# Patient Record
Sex: MALE | Race: OTHER | HISPANIC OR LATINO | URBAN - METROPOLITAN AREA
[De-identification: names, ages, dates, MRNs, and addresses within clinical notes are randomized per-mention and may not be internally consistent; named-entity substitution may affect disease eponyms.]

---

## 2018-07-28 ENCOUNTER — EMERGENCY (EMERGENCY)
Facility: HOSPITAL | Age: 23
LOS: 1 days | Discharge: HOME | End: 2018-07-28
Attending: EMERGENCY MEDICINE | Admitting: EMERGENCY MEDICINE
Payer: SELF-PAY

## 2018-07-28 VITALS
SYSTOLIC BLOOD PRESSURE: 165 MMHG | OXYGEN SATURATION: 100 % | DIASTOLIC BLOOD PRESSURE: 97 MMHG | WEIGHT: 169.98 LBS | RESPIRATION RATE: 18 BRPM | TEMPERATURE: 99 F | HEART RATE: 66 BPM

## 2018-07-28 VITALS
DIASTOLIC BLOOD PRESSURE: 85 MMHG | OXYGEN SATURATION: 100 % | TEMPERATURE: 98 F | HEART RATE: 69 BPM | SYSTOLIC BLOOD PRESSURE: 120 MMHG | RESPIRATION RATE: 20 BRPM

## 2018-07-28 PROCEDURE — 96374 THER/PROPH/DIAG INJ IV PUSH: CPT

## 2018-07-28 PROCEDURE — 96372 THER/PROPH/DIAG INJ SC/IM: CPT | Mod: XU

## 2018-07-28 PROCEDURE — 99284 EMERGENCY DEPT VISIT MOD MDM: CPT

## 2018-07-28 PROCEDURE — 73030 X-RAY EXAM OF SHOULDER: CPT | Mod: 26,RT

## 2018-07-28 PROCEDURE — T1013: CPT

## 2018-07-28 PROCEDURE — 73030 X-RAY EXAM OF SHOULDER: CPT

## 2018-07-28 PROCEDURE — 99285 EMERGENCY DEPT VISIT HI MDM: CPT | Mod: 25

## 2018-07-28 RX ORDER — MIDAZOLAM HYDROCHLORIDE 1 MG/ML
4 INJECTION, SOLUTION INTRAMUSCULAR; INTRAVENOUS ONCE
Qty: 0 | Refills: 0 | Status: DISCONTINUED | OUTPATIENT
Start: 2018-07-28 | End: 2018-07-28

## 2018-07-28 RX ORDER — PROPOFOL 10 MG/ML
50 INJECTION, EMULSION INTRAVENOUS ONCE
Qty: 0 | Refills: 0 | Status: COMPLETED | OUTPATIENT
Start: 2018-07-28 | End: 2018-07-28

## 2018-07-28 RX ADMIN — MIDAZOLAM HYDROCHLORIDE 4 MILLIGRAM(S): 1 INJECTION, SOLUTION INTRAMUSCULAR; INTRAVENOUS at 13:30

## 2018-07-28 RX ADMIN — PROPOFOL 50 MILLIGRAM(S): 10 INJECTION, EMULSION INTRAVENOUS at 14:52

## 2018-07-28 NOTE — ED ADULT NURSE NOTE - OBJECTIVE STATEMENT
}Pt axox3 co pain to right shoulder after hurting it while reaching for a bucket at work? + deformity noted to right shoulder, + pulses noted to affected extremity

## 2018-07-28 NOTE — ED PROVIDER NOTE - ATTENDING CONTRIBUTION TO CARE
Right shoulder dislocation. Sensation intact. Shoulder will be reduced. I have discussed the plan with the ACP.

## 2018-07-28 NOTE — ED ADULT NURSE REASSESSMENT NOTE - NS ED NURSE REASSESS COMMENT FT1
Pt to be discharged home with outpt follow up with ortho .Pt denies c/op ain at time of DC, vitals within normal limits

## 2018-07-28 NOTE — ED ADULT NURSE REASSESSMENT NOTE - NS ED NURSE REASSESS COMMENT FT1
Pt completed conscious sedation procedure with out any complications.  Pt vitals with normal limits, axox3 an denies any pain at this time.

## 2018-07-28 NOTE — ED ADULT NURSE REASSESSMENT NOTE - NS ED NURSE REASSESS COMMENT FT1
SUNNY monaco unable to re-align right shoulder , pt extremely tense and not relaxing. IM versed to be used, pt placed in A2, nasal cannula and pulse ox applied. Pt updated on plan of care by

## 2018-07-31 ENCOUNTER — EMERGENCY (EMERGENCY)
Facility: HOSPITAL | Age: 23
LOS: 1 days | Discharge: HOME | End: 2018-07-31
Attending: EMERGENCY MEDICINE
Payer: SELF-PAY

## 2018-07-31 VITALS — WEIGHT: 190.04 LBS

## 2018-07-31 PROCEDURE — 73030 X-RAY EXAM OF SHOULDER: CPT | Mod: 26,76,RT

## 2018-07-31 PROCEDURE — 23650 CLTX SHO DSLC W/MNPJ WO ANES: CPT | Mod: 54

## 2018-07-31 PROCEDURE — 99284 EMERGENCY DEPT VISIT MOD MDM: CPT | Mod: 25

## 2018-07-31 PROCEDURE — 99152 MOD SED SAME PHYS/QHP 5/>YRS: CPT

## 2018-07-31 RX ORDER — MORPHINE SULFATE 50 MG/1
5 CAPSULE, EXTENDED RELEASE ORAL ONCE
Qty: 0 | Refills: 0 | Status: DISCONTINUED | OUTPATIENT
Start: 2018-07-31 | End: 2018-07-31

## 2018-07-31 RX ORDER — MIDAZOLAM HYDROCHLORIDE 1 MG/ML
2 INJECTION, SOLUTION INTRAMUSCULAR; INTRAVENOUS ONCE
Qty: 0 | Refills: 0 | Status: DISCONTINUED | OUTPATIENT
Start: 2018-07-31 | End: 2018-07-31

## 2018-07-31 RX ORDER — PROPOFOL 10 MG/ML
50 INJECTION, EMULSION INTRAVENOUS ONCE
Qty: 0 | Refills: 0 | Status: COMPLETED | OUTPATIENT
Start: 2018-07-31 | End: 2018-07-31

## 2018-07-31 RX ORDER — IBUPROFEN 200 MG
600 TABLET ORAL ONCE
Qty: 0 | Refills: 0 | Status: COMPLETED | OUTPATIENT
Start: 2018-07-31 | End: 2018-07-31

## 2018-07-31 RX ORDER — MORPHINE SULFATE 50 MG/1
4 CAPSULE, EXTENDED RELEASE ORAL ONCE
Qty: 0 | Refills: 0 | Status: DISCONTINUED | OUTPATIENT
Start: 2018-07-31 | End: 2018-07-31

## 2018-07-31 RX ADMIN — MIDAZOLAM HYDROCHLORIDE 2 MILLIGRAM(S): 1 INJECTION, SOLUTION INTRAMUSCULAR; INTRAVENOUS at 23:17

## 2018-07-31 RX ADMIN — MIDAZOLAM HYDROCHLORIDE 2 MILLIGRAM(S): 1 INJECTION, SOLUTION INTRAMUSCULAR; INTRAVENOUS at 23:05

## 2018-07-31 RX ADMIN — MORPHINE SULFATE 5 MILLIGRAM(S): 50 CAPSULE, EXTENDED RELEASE ORAL at 23:13

## 2018-07-31 RX ADMIN — MORPHINE SULFATE 5 MILLIGRAM(S): 50 CAPSULE, EXTENDED RELEASE ORAL at 22:48

## 2018-07-31 RX ADMIN — PROPOFOL 50 MILLIGRAM(S): 10 INJECTION, EMULSION INTRAVENOUS at 23:19

## 2018-07-31 RX ADMIN — PROPOFOL 50 MILLIGRAM(S): 10 INJECTION, EMULSION INTRAVENOUS at 22:59

## 2018-07-31 NOTE — ED PROVIDER NOTE - PROGRESS NOTE DETAILS
repeat xray shows shoulder successfully reduced. PT placed in sling. PT is awake and alert at this time will continue to monitor. PT alert, vitals stable. PT given referral to ORtho MD. PT educated on LROM of shoulder.

## 2018-07-31 NOTE — ED PROVIDER NOTE - OBJECTIVE STATEMENT
22 yo M PT, PmHX: multiple  shoulder dislocations, presents to ED complaining of R shoulder pain x 2 hrs. Pt states he was doing a movement and thinks his shoulder "popped" out. Pt states this is his 5th R shoulder dislocation. PT recently seen in Ed 3 days ago for similar symptoms. Pt denies numbness, tingling, weakness, recent trauma, and any other acute symptoms at this time.

## 2018-07-31 NOTE — ED PROVIDER NOTE - ATTENDING CONTRIBUTION TO CARE
I personally saw the patient with the PA, and completed the key components of the history and physical exam. I then discussed the management plan with the PA.   gen in nad resp clear cardiac no murmur abd soft msk + ttp right ant shoulder with decreased rom   reduced under consious sedation neurovasc intact post sedation stressed importance of ortho f/u again

## 2018-07-31 NOTE — ED PROVIDER NOTE - MEDICAL DECISION MAKING DETAILS
shoulder dislocation, will relocate, sling and follow up with Ortho. Educate PT on limit ROM of shoulder for 48-72 hrs.

## 2018-07-31 NOTE — ED PROCEDURE NOTE - NS_POSTPROCCAREGUIDE_ED_ALL_ED
Patient is now fully awake, with vital signs and temperature stable, hydration is adequate, patients Dario’s  score is at baseline (or greater than 8), patient and escort has received  discharge education.

## 2018-07-31 NOTE — ED PROCEDURE NOTE - NS ED PERI VASCULAR NEG
capillary refill time < 2 seconds/no swelling/fingers/toes warm to touch/no paresthesia/no cyanosis of extremity

## 2018-08-01 VITALS
OXYGEN SATURATION: 100 % | HEART RATE: 89 BPM | RESPIRATION RATE: 16 BRPM | SYSTOLIC BLOOD PRESSURE: 136 MMHG | TEMPERATURE: 99 F | DIASTOLIC BLOOD PRESSURE: 84 MMHG

## 2018-08-01 PROCEDURE — 73030 X-RAY EXAM OF SHOULDER: CPT

## 2018-08-01 PROCEDURE — 96374 THER/PROPH/DIAG INJ IV PUSH: CPT | Mod: XU

## 2018-08-01 PROCEDURE — 23650 CLTX SHO DSLC W/MNPJ WO ANES: CPT | Mod: RT

## 2018-08-01 PROCEDURE — T1013: CPT

## 2018-08-01 PROCEDURE — 99285 EMERGENCY DEPT VISIT HI MDM: CPT | Mod: 25

## 2018-08-01 PROCEDURE — 99152 MOD SED SAME PHYS/QHP 5/>YRS: CPT

## 2018-08-01 RX ADMIN — PROPOFOL 50 MILLIGRAM(S): 10 INJECTION, EMULSION INTRAVENOUS at 00:50

## 2018-08-01 NOTE — ED ADULT NURSE NOTE - OBJECTIVE STATEMENT
pt recently dislocated right shoulder was in sling and decided to go exercising extending right arm he was told not to. pt re-dislocated right shoulder.

## 2018-08-01 NOTE — ED PROCEDURE NOTE - PROCEDURE ADDITIONAL DETAILS
using sterile technique 10cc lidocaine was injected in the right glenohumeral joint. scapula manipulation was attempted and was unsuccessful. The PT was then sedated and the shoulder reduced as documented above. shoulder immobilizer apple.

## 2018-08-01 NOTE — ED ADULT NURSE NOTE - NSIMPLEMENTINTERV_GEN_ALL_ED
Implemented All Universal Safety Interventions:  [place] to call system. Call bell, personal items and telephone within reach. Instruct patient to call for assistance. Room bathroom lighting operational. Non-slip footwear when patient is off stretcher. Physically safe environment: no spills, clutter or unnecessary equipment. Stretcher in lowest position, wheels locked, appropriate side rails in place.

## 2021-02-16 ENCOUNTER — EMERGENCY (EMERGENCY)
Facility: HOSPITAL | Age: 26
LOS: 1 days | Discharge: HOME | End: 2021-02-16
Attending: EMERGENCY MEDICINE
Payer: MEDICARE

## 2021-02-16 VITALS
HEART RATE: 124 BPM | TEMPERATURE: 100 F | OXYGEN SATURATION: 97 % | RESPIRATION RATE: 18 BRPM | SYSTOLIC BLOOD PRESSURE: 124 MMHG | DIASTOLIC BLOOD PRESSURE: 86 MMHG

## 2021-02-16 VITALS — HEART RATE: 99 BPM | RESPIRATION RATE: 16 BRPM | OXYGEN SATURATION: 98 %

## 2021-02-16 LAB
ANION GAP SERPL CALC-SCNC: 9 MMOL/L — SIGNIFICANT CHANGE UP (ref 5–17)
BUN SERPL-MCNC: 9 MG/DL — SIGNIFICANT CHANGE UP (ref 8–20)
CALCIUM SERPL-MCNC: 8.8 MG/DL — SIGNIFICANT CHANGE UP (ref 8.6–10.2)
CHLORIDE SERPL-SCNC: 101 MMOL/L — SIGNIFICANT CHANGE UP (ref 98–107)
CO2 SERPL-SCNC: 26 MMOL/L — SIGNIFICANT CHANGE UP (ref 22–29)
CREAT SERPL-MCNC: 0.97 MG/DL — SIGNIFICANT CHANGE UP (ref 0.5–1.3)
GLUCOSE SERPL-MCNC: 108 MG/DL — HIGH (ref 70–99)
HCT VFR BLD CALC: 49.3 % — SIGNIFICANT CHANGE UP (ref 39–50)
HGB BLD-MCNC: 16.9 G/DL — SIGNIFICANT CHANGE UP (ref 13–17)
MCHC RBC-ENTMCNC: 30.4 PG — SIGNIFICANT CHANGE UP (ref 27–34)
MCHC RBC-ENTMCNC: 34.3 GM/DL — SIGNIFICANT CHANGE UP (ref 32–36)
MCV RBC AUTO: 88.7 FL — SIGNIFICANT CHANGE UP (ref 80–100)
PLATELET # BLD AUTO: 195 K/UL — SIGNIFICANT CHANGE UP (ref 150–400)
POTASSIUM SERPL-MCNC: 4.4 MMOL/L — SIGNIFICANT CHANGE UP (ref 3.5–5.3)
POTASSIUM SERPL-SCNC: 4.4 MMOL/L — SIGNIFICANT CHANGE UP (ref 3.5–5.3)
RBC # BLD: 5.56 M/UL — SIGNIFICANT CHANGE UP (ref 4.2–5.8)
RBC # FLD: 12.3 % — SIGNIFICANT CHANGE UP (ref 10.3–14.5)
SARS-COV-2 RNA SPEC QL NAA+PROBE: DETECTED
SODIUM SERPL-SCNC: 136 MMOL/L — SIGNIFICANT CHANGE UP (ref 135–145)
T3 SERPL-MCNC: 141 NG/DL — SIGNIFICANT CHANGE UP (ref 80–200)
T4 AB SER-ACNC: 8.2 UG/DL — SIGNIFICANT CHANGE UP (ref 4.5–12)
TSH SERPL-MCNC: 1.8 UIU/ML — SIGNIFICANT CHANGE UP (ref 0.27–4.2)
WBC # BLD: 4.26 K/UL — SIGNIFICANT CHANGE UP (ref 3.8–10.5)
WBC # FLD AUTO: 4.26 K/UL — SIGNIFICANT CHANGE UP (ref 3.8–10.5)

## 2021-02-16 PROCEDURE — 99284 EMERGENCY DEPT VISIT MOD MDM: CPT

## 2021-02-16 PROCEDURE — 84436 ASSAY OF TOTAL THYROXINE: CPT

## 2021-02-16 PROCEDURE — 84443 ASSAY THYROID STIM HORMONE: CPT

## 2021-02-16 PROCEDURE — 85027 COMPLETE CBC AUTOMATED: CPT

## 2021-02-16 PROCEDURE — U0005: CPT

## 2021-02-16 PROCEDURE — 93005 ELECTROCARDIOGRAM TRACING: CPT

## 2021-02-16 PROCEDURE — 80048 BASIC METABOLIC PNL TOTAL CA: CPT

## 2021-02-16 PROCEDURE — 36415 COLL VENOUS BLD VENIPUNCTURE: CPT

## 2021-02-16 PROCEDURE — U0003: CPT

## 2021-02-16 PROCEDURE — 93010 ELECTROCARDIOGRAM REPORT: CPT

## 2021-02-16 PROCEDURE — 99283 EMERGENCY DEPT VISIT LOW MDM: CPT | Mod: 25

## 2021-02-16 PROCEDURE — 96360 HYDRATION IV INFUSION INIT: CPT

## 2021-02-16 PROCEDURE — 84480 ASSAY TRIIODOTHYRONINE (T3): CPT

## 2021-02-16 RX ORDER — ACETAMINOPHEN 500 MG
650 TABLET ORAL ONCE
Refills: 0 | Status: COMPLETED | OUTPATIENT
Start: 2021-02-16 | End: 2021-02-16

## 2021-02-16 RX ORDER — SODIUM CHLORIDE 9 MG/ML
1000 INJECTION INTRAMUSCULAR; INTRAVENOUS; SUBCUTANEOUS ONCE
Refills: 0 | Status: COMPLETED | OUTPATIENT
Start: 2021-02-16 | End: 2021-02-16

## 2021-02-16 RX ADMIN — Medication 650 MILLIGRAM(S): at 12:34

## 2021-02-16 RX ADMIN — SODIUM CHLORIDE 1000 MILLILITER(S): 9 INJECTION INTRAMUSCULAR; INTRAVENOUS; SUBCUTANEOUS at 13:45

## 2021-02-16 RX ADMIN — SODIUM CHLORIDE 1000 MILLILITER(S): 9 INJECTION INTRAMUSCULAR; INTRAVENOUS; SUBCUTANEOUS at 12:45

## 2021-02-16 NOTE — ED PROVIDER NOTE - ATTENDING CONTRIBUTION TO CARE
Pt seen and evaluated with ACP and ED resident.  26 yo M with no significant PMH referred from COVID testing tent c/o cold s/s with no reported fever after being found to be tachycardic in 120'2.  Pt denies any assoc c/o.  Pt denies any OTC cold meds, stimulants  or any prior hx of dysrhythmia or cardiac ds.  On exam pt awake and alert, well appearing in NAD, sinus tachy 116 bpm, PERRL, mm moist, Neck supple, no thyroid masses, Cor Tachy, Lungs clear b/l, Abd soft, NT, Ext FROM, Neuro non-focal.  Will check EKG, labs. TSH, monitor and d/w Cardio as needed

## 2021-02-16 NOTE — ED PROVIDER NOTE - PHYSICAL EXAMINATION
Gen: WD/WN, NAD, non-toxic  HENT: NCAT, MMM, Oropharynx unremarkable, uvula midline  Cardiac: RRR +S1S2.   Resp: Speaking in full sentences. No evidence of respiratory distress. No wheezes, rales or rhonchi  Skin: Warm, dry, no rashes or lesions  Neuro: Awake, alert & oriented x 3. No focal deficits, ambulatory with steady gait

## 2021-02-16 NOTE — ED PROVIDER NOTE - CLINICAL SUMMARY MEDICAL DECISION MAKING FREE TEXT BOX
26yo M with cold symptoms 4 days ago, today feeling well. Well appearing, non-toxic, NAD. 24 yo male presenting for COVID swab. Asymptomatic today, found to be persistently tachy. Will give fluid, tylenol, check labs, ekg.

## 2021-02-16 NOTE — ED PROVIDER NOTE - OBJECTIVE STATEMENT
26yo M presenting for covid testing. Pt states he felt like he had a cold on Friday, 4 days ago, but symptoms have since resolved. No exposures or sick contacts. Denies fever, chills, cough, sob, cp, body aches, loss of smell/taste.

## 2021-02-16 NOTE — ED PROVIDER NOTE - NS ED ROS FT
Gen: denies fever, chills, fatigue, weight loss  Skin: denies rashes, laceration, bruising  HEENT: denies visual changes, ear pain, nasal congestion, throat pain  Respiratory: denies HINTON, SOB, cough, wheezing  Cardiovascular: denies chest pain, palpitations  GI: denies abdominal pain, n/v/d

## 2021-02-16 NOTE — ED PROVIDER NOTE - PROGRESS NOTE DETAILS
VS temp 99.6 oral, . pt given 2 tylenol and encouraged to drink water. No chest pain, no sob, no palpitations, states he is feeling well. Will observe and re-assess HR 26yo M presenting to tent area for covid testing. Pt states he felt like he had a cold on Friday, 4 days ago, but symptoms have since resolved. No exposures or sick contacts. Denies fever, chills, cough, sob, cp, body aches, loss of smell/taste. Triage VS temp 99.6 oral, HR ~124-132. pt given 2 tylenol and encouraged to drink water. No chest pain, no sob, no palpitations, states he is feeling well. Observed for ~25 minutes but HR persistently mid 120s, sent back to ED for further evaluation. Dr. Peterson aware. Quique: Patient sent from tent to [facility]. Patient states he is feeling fine. Denies chest pain, palpitations, afebrile.  on monitor. Will do work-up and give fluid. Reassess.

## 2021-02-16 NOTE — ED PROVIDER NOTE - NSFOLLOWUPCLINICS_GEN_ALL_ED_FT
[facility]  Cardiology  [address], [place] [address]  Phone: [phone]  Fax:   Follow Up Time:

## 2021-02-16 NOTE — ED PROVIDER NOTE - NSFOLLOWUPINSTRUCTIONS_ED_ALL_ED_FT
Taquicardia sinusal    Sinus Tachycardia       La taquicardia sinusal es un tipo de latido cardíaco rápido. En la taquicardia sinusal, el corazón late más de 100 veces por minuto. La taquicardia sinusal comienza en james parte del corazón llamada nódulo sinusal. Puede ser inofensiva kika también puede ser un signo de james enfermedad más grave.      ¿Cuáles son las causas?  Esta afección puede ser causada por lo siguiente:  •Actividad física o esfuerzo.      •Fiebre.      •Dolor.      •Pérdida de líquidos corporales (deshidratación).      •Sangrado intenso (hemorragia).      •Ansiedad y estrés.    •Determinadas sustancias, tales kelsey:  •Alcohol.      •Cafeína.      •Productos con tabaco y nicotina.      •Medicamentos para el resfrío.      •Drogas ilegales.      •Las afecciones incluyen las siguientes:  •Enfermedad cardíaca.      •James infección.      •Hiperactividad de la glándula tiroidea (hipertiroidismo).      •Recuento bajo de glóbulos rojos (anemia).          ¿Cuáles son los signos o síntomas?  Los síntomas de esta afección incluyen:  •Sensación de que el corazón está latiendo rápido (palpitaciones).      •Percepción repentina de los latidos del corazón (conciencia cardíaca).      •Mareos.      •Cansancio (fatiga).      •Falta de aire.      •Dolor en el pecho.      •Náuseas.      •Desmayos.        ¿Cómo se diagnostica?  Esta enfermedad se diagnostica mediante:  •Un examen físico.    •Otras pruebas, kelsey:  •Análisis de izabella.      •Un electrocardiograma (ECG). Elena estudio mide la actividad eléctrica del corazón.      •Monitor cardíaco ambulatorio. Elena registra los latidos cardíacos gerardo 24 horas o más.        Es posible que lo deriven a un especialista en corazón (cardiólogo).      ¿Cómo se trata?  El tratamiento de esta afección depende de la causa o de la enfermedad preexistente. El tratamiento puede implicar lo siguiente:  •Tratar la afección preexistente.      •Araceli nuevos medicamentos o cambiar los medicamentos actuales ganesh kelsey se lo haya indicado el médico.      •Realizar cambios en la dieta o el estilo de amira.        Siga estas indicaciones en werner casa:      Estilo de amira      • No consuma ningún producto que contenga nicotina o tabaco, kelsey cigarrillos y cigarrillos electrónicos. Si necesita ayuda para dejar de fumar, consulte al médico.      • No consuma drogas, kelsey cocaína.      •Aprenda métodos de relajación que lo ayudarán cuando se sienta estresado o ansioso. Pawel de estos métodos es la respiración profunda.      •Evite la cafeína y otros estimulantes.        Consumo de alcohol    • No kimber alcohol si:  •Werner médico le indica no hacerlo.      •Está embarazada, quin que puede estar embarazada o está tratando de quedar embarazada.      •Si misty alcohol, limite la cantidad que consume:  •De 0 a 1 medida por día para las mujeres.      •De 0 a 2 medidas por día para los hombres.        •Esté atento a la cantidad de alcohol que hay en las bebidas que allison. En los [place], james medida equivale a james botella típica de cerveza (12 oz/355 ml), medio vaso de vino (5 oz/148 ml) o un "trago" de bebidas alcohólicas de angel graduación (1½ oz/45 ml).      Indicaciones generales     •Kmiber suficiente líquido para mantener la orina de color amarillo pálido.      •[place] los medicamentos de venta fabian y los recetados solamente kelsey se lo haya indicado el médico.      •Concurra a todas las visitas de control kelsey se lo haya indicado el médico. [place] es importante.        Comuníquese con un médico si tiene:    •Fiebre.      •Vómitos o diarrea que no desaparecen.        Solicite ayuda inmediatamente si:    •Siente dolor en el pecho, en la parte superior de los brazos, en la mandíbula o en el ami.      •Se siente débil o mareado.      •Siente que va a desmayarse.      •Tiene palpitaciones que no desaparecen.        Resumen    •En la taquicardia sinusal, el corazón late más de 100 veces por minuto.      •Puede ser inofensiva kika también puede ser un signo de james enfermedad más grave.      •El tratamiento de esta afección depende de la causa o de la enfermedad preexistente.      •Si siente dolor en el pecho, la parte superior de los brazos, la mandíbula o el ami, busque ayuda de inmediato.      Esta información no tiene kelsey fin reemplazar el consejo del médico. Asegúrese de hacerle al médico cualquier pregunta que tenga.

## 2021-02-16 NOTE — ED PROVIDER NOTE - PATIENT PORTAL LINK FT
You can access the FollowMyHealth Patient Portal offered by [facility] by registering at the following website: [email or web address]. By joining [employer]’s FollowMyHealth portal, you will also be able to view your health information using other applications (apps) compatible with our system.

## 2022-09-03 ENCOUNTER — EMERGENCY (EMERGENCY)
Facility: HOSPITAL | Age: 27
LOS: 1 days | Discharge: HOME | End: 2022-09-03
Attending: STUDENT IN AN ORGANIZED HEALTH CARE EDUCATION/TRAINING PROGRAM
Payer: SELF-PAY

## 2022-09-03 VITALS
HEART RATE: 102 BPM | DIASTOLIC BLOOD PRESSURE: 86 MMHG | SYSTOLIC BLOOD PRESSURE: 134 MMHG | OXYGEN SATURATION: 98 % | TEMPERATURE: 99 F | RESPIRATION RATE: 18 BRPM | WEIGHT: 199.96 LBS

## 2022-09-03 PROCEDURE — 99053 MED SERV 10PM-8AM 24 HR FAC: CPT

## 2022-09-03 PROCEDURE — 23650 CLTX SHO DSLC W/MNPJ WO ANES: CPT | Mod: 54

## 2022-09-03 PROCEDURE — 99284 EMERGENCY DEPT VISIT MOD MDM: CPT | Mod: 57

## 2022-09-03 NOTE — ED ADULT TRIAGE NOTE - CHIEF COMPLAINT QUOTE
pt c/o right shoulder dislocation injured "while sleeping", +deformity, +swelling, no past medical hx

## 2022-09-04 VITALS
HEART RATE: 55 BPM | RESPIRATION RATE: 19 BRPM | DIASTOLIC BLOOD PRESSURE: 95 MMHG | OXYGEN SATURATION: 100 % | SYSTOLIC BLOOD PRESSURE: 117 MMHG

## 2022-09-04 PROCEDURE — 23650 CLTX SHO DSLC W/MNPJ WO ANES: CPT | Mod: RT

## 2022-09-04 PROCEDURE — 73030 X-RAY EXAM OF SHOULDER: CPT

## 2022-09-04 PROCEDURE — 99152 MOD SED SAME PHYS/QHP 5/>YRS: CPT

## 2022-09-04 PROCEDURE — 96372 THER/PROPH/DIAG INJ SC/IM: CPT | Mod: XU

## 2022-09-04 PROCEDURE — 73030 X-RAY EXAM OF SHOULDER: CPT | Mod: 26,RT

## 2022-09-04 PROCEDURE — 96374 THER/PROPH/DIAG INJ IV PUSH: CPT | Mod: XU

## 2022-09-04 PROCEDURE — 99285 EMERGENCY DEPT VISIT HI MDM: CPT | Mod: 25

## 2022-09-04 RX ORDER — DIAZEPAM 5 MG
5 TABLET ORAL ONCE
Refills: 0 | Status: DISCONTINUED | OUTPATIENT
Start: 2022-09-04 | End: 2022-09-04

## 2022-09-04 RX ORDER — PROPOFOL 10 MG/ML
200 INJECTION, EMULSION INTRAVENOUS ONCE
Refills: 0 | Status: COMPLETED | OUTPATIENT
Start: 2022-09-04 | End: 2022-09-04

## 2022-09-04 RX ORDER — MORPHINE SULFATE 50 MG/1
4 CAPSULE, EXTENDED RELEASE ORAL ONCE
Refills: 0 | Status: DISCONTINUED | OUTPATIENT
Start: 2022-09-04 | End: 2022-09-04

## 2022-09-04 RX ADMIN — Medication 5 MILLIGRAM(S): at 00:53

## 2022-09-04 RX ADMIN — MORPHINE SULFATE 4 MILLIGRAM(S): 50 CAPSULE, EXTENDED RELEASE ORAL at 00:57

## 2022-09-04 RX ADMIN — PROPOFOL 200 MILLIGRAM(S): 10 INJECTION, EMULSION INTRAVENOUS at 02:25

## 2022-09-04 NOTE — ED PROVIDER NOTE - PATIENT PORTAL LINK FT
You can access the FollowMyHealth Patient Portal offered by [facility] by registering at the following website: [email or web address]. By joining [employer]’s FollowMyHealth portal, you will also be able to view your health information using other applications (apps) compatible with our system.

## 2022-09-04 NOTE — ED PROVIDER NOTE - PHYSICAL EXAMINATION
MSK- right shoulder unabel to touch opposite shoulder, head of humerus anterior to glenoid  Pulses- 2+ RP
0 = understands/communicates without difficulty

## 2022-09-04 NOTE — ED PROVIDER NOTE - PROGRESS NOTE DETAILS
POLO- shoulder reduced under conscious sedation, Pt reassessed, pt feeling better at this time, vss, pt able to walk, talk and vocalized plan of action. Discussed in depth and explained to pt in depth the next steps that need to be taking including proper follow up with PCP or specialists. All incidental findings were discussed with pt as well. Pt verbalized their concerns and all questions were answered. Pt understands dispo and wants discharge. Given good instructions when to return to ED and importance of f/u.

## 2022-09-04 NOTE — ED ADULT NURSE REASSESSMENT NOTE - NS ED NURSE REASSESS COMMENT FT1
Assumed care of patient at 0220 from KENNETH Watson in  for R shoulder dislocation requiring procedural sedation. Patient moved to CC area of ED, placed on CM in NSR, SPO2 WNL on RA, ETCO2 34. Emergency airway equipment at bedside. MD Warner and SUNNY Shepard at bedside. Consent for procedure obtained and scanned into chart. IVF initiated.

## 2022-09-04 NOTE — ED PROVIDER NOTE - OBJECTIVE STATEMENT
28 y/o male with history of shoulder dislocation presents to the ED c/o right shoulder pain. notes he woke up and felt his shoulder pop out. Pain 10/10 in intensity worsening with movement. Denies numbness, tingling, coldness,

## 2022-09-04 NOTE — ED PROCEDURE NOTE - CPROC ED TIME OUT STATEMENT1
“Patient's name, , procedure and correct site were confirmed during the [place] Timeout.”
“Patient's name, , procedure and correct site were confirmed during the [place] Timeout.”

## 2022-09-04 NOTE — ED PROVIDER NOTE - CARE PROVIDER_API CALL
Antonio Perez)  Orthopaedic Surgery  [address] [place] [address]  Phone: [phone]  Fax: [phone]  Follow Up Time:

## 2022-09-04 NOTE — ED PROVIDER NOTE - NSFOLLOWUPINSTRUCTIONS_ED_ALL_ED_FT
Dislocation    A dislocation is a displacement of the bones that form a joint. This can result from trauma or due to a previous weakening of that joint. Symptoms include pain, swelling, and deformity at the site. Your health care provider has performed maneuvers to place the bones back in place. If a splint or brace was applied, make sure to wear it until you see an orthopedist as instructed.     SEEK IMMEDIATE MEDICAL CARE IF YOU HAVE ANY OF THE FOLLOWING SYMPTOMS: numbness, tingling, pain, weakness, or skin color/temperature change in any part of your body distal to the injury.      Procedural Sedation    During your Emergency Department visit, you might have undergone procedural sedation. If you were, you were given medication to help relax you and alleviate pain to aid in a diagnostic or therapeutic procedure. The medication given is typically short-acting but may have some lingering effects for up to 48 hours. Do not be alone - have a responsible adult stay with you until you are awake and alert. Do not drive or operate heavy machinery for the next 24 hours. Do not drink alcohol or smoke or take medicines that cause drowsiness. Do not make important decisions or sign legal documents or take care of children on your own.    SEEK IMMEDIATE MEDICAL CARE IF YOU HAVE ANY OF THE FOLLOWING SYMPTOMS: trouble breathing, confusion, inability to urinate, severe pain, rash, lightheadedness/dizziness, or fainting.

## 2022-09-04 NOTE — ED PROVIDER NOTE - ATTENDING APP SHARED VISIT CONTRIBUTION OF CARE
Timmy LUONG: I have personally seen and examined this patient. I have fully participated in the care of this patient. I have made amendments to the documentation where appropriate and otherwise agree with the history, physical exam, and plan as documented by the ACP. My brief assessment is as follows:    27y M w/ hx prior shoulder dislocations, presented for R shoulder injury. X-rays showing dislocation. Shoulder reduced under procedural sedation. Discharged in sling. F/u with ortho.

## 2023-12-09 ENCOUNTER — EMERGENCY (EMERGENCY)
Facility: HOSPITAL | Age: 28
LOS: 1 days | Discharge: HOME | End: 2023-12-09
Attending: EMERGENCY MEDICINE
Payer: SELF-PAY

## 2023-12-09 VITALS
SYSTOLIC BLOOD PRESSURE: 134 MMHG | WEIGHT: 212.31 LBS | OXYGEN SATURATION: 99 % | HEART RATE: 78 BPM | RESPIRATION RATE: 16 BRPM | DIASTOLIC BLOOD PRESSURE: 66 MMHG | HEIGHT: 69 IN | TEMPERATURE: 99 F

## 2023-12-09 PROCEDURE — 99284 EMERGENCY DEPT VISIT MOD MDM: CPT

## 2023-12-09 PROCEDURE — T1013: CPT

## 2023-12-09 PROCEDURE — 99283 EMERGENCY DEPT VISIT LOW MDM: CPT

## 2023-12-09 RX ORDER — IBUPROFEN 200 MG
1 TABLET ORAL
Qty: 12 | Refills: 0
Start: 2023-12-09 | End: 2023-12-12
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
## 2023-12-09 NOTE — ED PROVIDER NOTE - PATIENT PORTAL LINK FT
You can access the FollowMyHealth Patient Portal offered by [facility] by registering at the following website: [email or web address]. By joining [employer]’s FollowMyHealth portal, you will also be able to view your health information using other applications (apps) compatible with our system. You can access the FollowMyHealth Patient Portal offered by [facility] by registering at the following website: [email or web address]. By joining [employer]’s FollowMyHealth portal, you will also be able to view your health information using other applications (apps) compatible with our system.
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
## 2024-02-15 NOTE — ED PROVIDER NOTE - NSFOLLOWUPINSTRUCTIONS_ED_ALL_ED_FT
[No Acute Distress] : no acute distress [Well Nourished] : well nourished [Normal Sclera/Conjunctiva] : normal sclera/conjunctiva [No Respiratory Distress] : no respiratory distress  [No Accessory Muscle Use] : no accessory muscle use [Clear to Auscultation] : lungs were clear to auscultation bilaterally [Normal Rate] : normal rate  [Regular Rhythm] : with a regular rhythm [Normal S1, S2] : normal S1 and S2 [Normal Affect] : the affect was normal [Normal Insight/Judgement] : insight and judgment were intact motrin as needed for pain

## 2024-04-14 ENCOUNTER — EMERGENCY (EMERGENCY)
Facility: HOSPITAL | Age: 29
LOS: 1 days | Discharge: HOME | End: 2024-04-14
Attending: STUDENT IN AN ORGANIZED HEALTH CARE EDUCATION/TRAINING PROGRAM
Payer: SELF-PAY

## 2024-04-14 VITALS
HEART RATE: 95 BPM | HEIGHT: 69 IN | WEIGHT: 214.29 LBS | OXYGEN SATURATION: 99 % | SYSTOLIC BLOOD PRESSURE: 141 MMHG | TEMPERATURE: 99 F | DIASTOLIC BLOOD PRESSURE: 83 MMHG

## 2024-04-14 PROCEDURE — 99284 EMERGENCY DEPT VISIT MOD MDM: CPT

## 2024-04-14 PROCEDURE — 99053 MED SERV 10PM-8AM 24 HR FAC: CPT

## 2024-04-15 PROCEDURE — T1013: CPT

## 2024-04-15 PROCEDURE — 99282 EMERGENCY DEPT VISIT SF MDM: CPT

## 2024-04-15 RX ORDER — FAMOTIDINE 10 MG/ML
1 INJECTION INTRAVENOUS
Qty: 5 | Refills: 0
Start: 2024-04-15 | End: 2024-04-19

## 2024-04-15 RX ORDER — FAMOTIDINE 10 MG/ML
20 INJECTION INTRAVENOUS ONCE
Refills: 0 | Status: COMPLETED | OUTPATIENT
Start: 2024-04-15 | End: 2024-04-15

## 2024-04-15 RX ADMIN — Medication 30 MILLILITER(S): at 00:56

## 2024-04-15 RX ADMIN — FAMOTIDINE 20 MILLIGRAM(S): 10 INJECTION INTRAVENOUS at 00:56

## 2024-04-15 NOTE — ED PROVIDER NOTE - OBJECTIVE STATEMENT
29 yo male no PMHx presents to ED c/o nausea. States ate shrimp and steak at a restaurant at 8:30pm, began with nausea 30 minutes afterwards with generalized weakness. At this time, only c/o upset stomach. No further complaints at this time.   Denies known allergies, rash, difficulty breathing, tongue swelling.

## 2024-04-15 NOTE — ED ADULT NURSE NOTE - CHIEF COMPLAINT QUOTE
Pt states after eating shrimp and steak he began feeling weakness and rufino sea. Pt denies allergies. No rash or respiratory concerns @ this time.

## 2024-04-15 NOTE — ED PROVIDER NOTE - PHYSICAL EXAMINATION
General: In NAD, non-toxic/well-appearing.  Skin: Warm, dry, color normal for race. Perfused. No urticaria.  Head: Normocephalic/atraumatic.   Eyes: Sclera anicteric, conjunctivae clear b/l. PERRLA, EOMI.   Throat: Airway patent. Tolerating secretions, no drooling. Moist mucus membranes. Tongue midline, no swelling.  Neck: Supple, FROM.   Cardio: Rate and rhythm regular. No murmurs.   PV: Capillary refill <2 seconds.  Resp: Speaking in full sentences. No visible nasal flaring, retractions, or deformity. No stridor. Breath sounds vesicular, symmetrical and without wheezing b/l.  Abd: Non-distended. Soft, non-tender.. No guarding.   MSK: MAEx4. FROM.   Neuro: A&Ox3. Appears nonfocal.

## 2024-04-15 NOTE — ED PROVIDER NOTE - PATIENT PORTAL LINK FT
You can access the FollowMyHealth Patient Portal offered by [facility] by registering at the following website: [email or web address]. By joining [employer]’s FollowMyHealth portal, you will also be able to view your health information using other applications (apps) compatible with our system.

## 2024-04-15 NOTE — ED PROVIDER NOTE - NSFOLLOWUPINSTRUCTIONS_ED_ALL_ED_FT
- Prescription sent to pharmacy.  - Increase fluids.  - [place] diet as tolerated. Avoid citrus based food/drink, spicy/greasy foods, milk, caffeine.   - Please call to schedule follow up appointment with your primary care physician within 24-48 hours.  - Please seek immediate medical attention for any new/worsening, signs/symptoms, or concerns.    Feel better!    - Receta enviada a farmacia.  - Incrementar los líquidos.  - Dieta blanda según la tolerancia. Evite los alimentos / bebidas a base de cítricos, los alimentos picantes / grasosos, la leche y la cafeína.  - Llame para programar james jack de seguimiento con werner médico de atención primaria dentro de las 24 a 48 horas.  - Busque atención médica inmediata ante cualquier nuevo / empeoramiento, signo / síntoma o inquietud.    ¡Sentirse mejor!    Náuseas, en adultos  Nausea, Adult    Las náuseas son james sensación de malestar en el estómago o de que se está por vomitar. Las náuseas en sí a menudo no constituyen james preocupación importante, kika pueden ser un signo temprano de problemas médicos más graves. Si las náuseas empeoran, pueden provocar vómitos. Si los vómitos empeoran o si usted no puede beber suficiente líquido, corre el riesgo de deshidratarse. La deshidratación puede causarle cansancio, sed, sequedad en la boca y disminución en la frecuencia con la que orina. Los adultos mayores y las personas que tienen otras enfermedades o un sistema de defensa (sistema inmunitario) débil tienen mayor riesgo de sufrir deshidratación. Los objetivos principales del tratamiento de las náuseas son los siguientes:    Aliviar j carlos náuseas.  Restringir los episodios reiterados de náuseas.  Prevenir los vómitos y la deshidratación.    Siga estas indicaciones en werner casa:  Controle j carlos síntomas para detectar cualquier cambio. Informe al médico acerca de los cambios. Siga las siguientes indicaciones kelsey se lo haya indicado werner médico.        Comida y bebida      Sidney jmaes solución de rehidratación oral (SRO). Esta es james bebida que se vende en farmacias y tiendas minoristas.  En la medida en que pueda, kimber líquidos sandra lentamente y en pequeñas cantidades. Los líquidos sandra incluyen agua, cubitos de hielo, bebidas deportivas bajas en calorías y jugo de fruta rebajado con agua (jugo de fruta diluido).  En la medida en que pueda, consuma alimentos blandos y fáciles de digerir en pequeñas cantidades. Estos alimentos incluyen bananas, compota de manzana, arroz, buzz magras, tostadas y galletas saladas.  Evite consumir líquidos que contengan mucha azúcar o cafeína, kelsey bebidas energéticas, bebidas deportivas y refrescos.  Evite gilma alcohol.  Evite los alimentos condimentados o con alto contenido de grasa.        Indicaciones generales    Sidney los medicamentos de venta fabian y los recetados solamente kelsey se lo haya indicado el médico.  Descanse en werner casa mientras se recupera.  Kimber suficiente líquido kelsey para mantener la orina de color amarillo pálido.  Cuando sienta náuseas, respire lenta y profundamente.  Evite oler cosas que tengan olores alexa.  Lávese las aung frecuentemente usando agua y jabón. Use desinfectante para aung si no dispone de agua y jabón.  Asegúrese de que todas las personas que viven en werner casa se laven ning las aung y con frecuencia.  Concurra a todas las visitas de control kelsey se lo haya indicado el médico. [place] es importante.    Comuníquese con un médico si:  Las náuseas empeoran.  Las náuseas no desaparecen después de dos días.  Vomita.  No puede beber líquidos sin vomitar.  Tiene alguno de los siguientes síntomas:    Síntomas nuevos.  Fiebre.  Dolor de collette.  Calambres musculares.  Erupción cutánea.  Dolor al orinar.  Se siente aturdido o mareado.    Solicite ayuda inmediatamente si:  Siente dolor en el pecho, el ami, los brazos o la mandíbula.  Se siente muy débil o se desmaya.  Vomita y el vómito es de color tinoco intenso o se asemeja al poso del café.  Tiene heces con izabella, de color rita, o con aspecto alquitranado.  Siente dolor de collette intenso, rigidez en el ami, o ambas cosas.  Tiene dolor intenso, cólicos o distensión abdominal.  Tiene dificultades para respirar o respira muy rápido.  Werner corazón late muy rápidamente.  Siente la piel fría y húmeda.  Se siente confundido.  Tiene signos de deshidratación, kelsey los siguientes:    Orina de color oscuro, muy escasa o falta de orina.  Labios agrietados.  Sequedad de boca.  Ojos hundidos.  Somnolencia.  Debilidad.    Estos síntomas pueden representar un problema grave que constituye james emergencia. No espere a frank si los síntomas desaparecen. Solicite atención médica de inmediato. Comuníquese con el servicio de emergencias de werner localidad (911 en los [place]). No conduzca por j carlos propios medios hasta el hospital.    Resumen  Las náuseas son james sensación de malestar en el estómago o de que se está por vomitar. Las náuseas en sí a menudo no constituyen james preocupación importante, kika pueden ser un signo temprano de problemas médicos más graves.  Si los vómitos empeoran o si usted no puede beber suficiente líquido, corre el riesgo de deshidratarse.  Siga las recomendaciones sobre qué debe comer y beber y use los medicamentos de venta fabian y recetados solamente kelsey se lo haya indicado el médico.  Comuníquese con un médico de inmediato si los síntomas empeoran o si presenta nuevos síntomas.  Concurra a todas las visitas de control kelsey se lo haya indicado el médico. [place] es importante.    NOTAS ADICIONALES E INSTRUCCIONES    Please follow up with your Primary MD in 24-48 hr.  Seek immediate medical care for any new/worsening signs or symptoms.

## 2024-04-15 NOTE — ED PROVIDER NOTE - ATTENDING APP SHARED VISIT CONTRIBUTION OF CARE
I, Leo Jimenez, personally saw the patient with the PA, and completed the key components of the history and physical exam. I then discussed the management plan with the PA.

## 2024-05-08 ENCOUNTER — EMERGENCY (EMERGENCY)
Facility: HOSPITAL | Age: 29
LOS: 1 days | Discharge: HOME | End: 2024-05-08
Attending: STUDENT IN AN ORGANIZED HEALTH CARE EDUCATION/TRAINING PROGRAM
Payer: MEDICAID

## 2024-05-08 VITALS — TEMPERATURE: 98 F | HEART RATE: 91 BPM | SYSTOLIC BLOOD PRESSURE: 117 MMHG | DIASTOLIC BLOOD PRESSURE: 76 MMHG

## 2024-05-08 VITALS
WEIGHT: 218.04 LBS | DIASTOLIC BLOOD PRESSURE: 82 MMHG | OXYGEN SATURATION: 97 % | TEMPERATURE: 98 F | HEIGHT: 69 IN | HEART RATE: 106 BPM | RESPIRATION RATE: 20 BRPM | SYSTOLIC BLOOD PRESSURE: 144 MMHG

## 2024-05-08 PROCEDURE — 99284 EMERGENCY DEPT VISIT MOD MDM: CPT

## 2024-05-08 RX ORDER — ONDANSETRON 8 MG/1
4 TABLET, FILM COATED ORAL ONCE
Refills: 0 | Status: COMPLETED | OUTPATIENT
Start: 2024-05-08 | End: 2024-05-08

## 2024-05-08 RX ADMIN — ONDANSETRON 4 MILLIGRAM(S): 8 TABLET, FILM COATED ORAL at 23:53

## 2024-05-08 RX ADMIN — Medication 30 MILLILITER(S): at 23:53

## 2024-05-08 NOTE — ED PROVIDER NOTE - NSFOLLOWUPINSTRUCTIONS_ED_ALL_ED_FT
Náuseas vómitos    Las náuseas son la sensación de que tienes que vomitar. A medida que las náuseas empeoran, pueden provocar vómitos. Los vómitos aumentan el riesgo de deshidratación. Los adultos mayores y las personas con otras enfermedades o con un sistema inmunológico débil tienen un mayor riesgo de deshidratación. Kimber líquidos sandra en cantidades pequeñas kika frecuentes según lo tolere. Consuma alimentos suaves y fáciles de digerir en pequeñas cantidades según los tolere.    BUSQUE ATENCIÓN MÉDICA INMEDIATA SI TIENE ALGUNO DE LOS SIGUIENTES SÍNTOMAS: fiebre, incapacidad para retener suficientes líquidos, vómito rita o con izabella, heces negras o con izabella, aturdimiento/mareos, dolor en el pecho, dolor de collette intenso, sarpullido, dificultad para respirar, resfriado o piel húmeda, confusión, dolor al orinar o cualquier signo de deshidratación.

## 2024-05-08 NOTE — ED PROVIDER NOTE - CLINICAL SUMMARY MEDICAL DECISION MAKING FREE TEXT BOX
29 year old male no PMHx present to ED for nausea, lightheaded. Pt reports he ate at 8pm, after eating had nausea with associated lightheadedness. pt reports symptoms lasted about 2 hours and have no resolved. pt denies fever, chills, lightheadedness, dizziness, change in vision, HA, SOB, CP, abd pain, nausea, vomiting, diarrhea, dysuria.  Meds, PO challenge, re-assess 29 year old male no PMHx present to ED for nausea, lightheaded. Pt reports he ate at 8pm, after eating had nausea with associated lightheadedness. pt reports symptoms lasted about 2 hours and have no resolved. pt denies fever, chills, lightheadedness, dizziness, change in vision, HA, SOB, CP, abd pain, nausea, vomiting, diarrhea, dysuria.  Meds, PO challenge, re-assess  pt able to tolerate PO  Pt reassessed, pt feeling better at this time, vss, pt able to walk, talk and vocalized plan of action. Discussed in depth and explained to pt in depth the next steps that need to be taken including proper follow up with PCP or specialists. All incidental findings were discussed with pt as well. Pt verbalized their concerns and all questions were answered. Pt understands dispo and wants discharge. Given good instructions when to return to ED, strict return precautions and importance of f/u.

## 2024-05-08 NOTE — ED PROVIDER NOTE - OBJECTIVE STATEMENT
29 year old male no PMHx present to ED for nausea, lightheaded. Pt reports he ate at 8pm, after eating had nausea with associated lightheadedness. pt reports symptoms lasted about 2 hours and have no resolved. pt denies fever, chills, lightheadedness, dizziness, change in vision, HA, SOB, CP, abd pain, nausea, vomiting, diarrhea, dysuria. 29 year old male no PMHx present to ED for nausea, lightheaded. Pt reports he ate at 8pm, after eating had nausea with associated lightheadedness. pt reports symptoms lasted about 2 hours and have now resolved. pt denies fever, chills, lightheadedness, dizziness, change in vision, HA, SOB, CP, abd pain, nausea, vomiting, diarrhea, dysuria.

## 2024-05-08 NOTE — ED PROVIDER NOTE - ATTENDING APP SHARED VISIT CONTRIBUTION OF CARE
Pt ate food this evening and then shortly afterwards started with nausea and lightheadedness. symptoms lasted approx. 2 h and resolved spontaneously. Pt with no complaints currently.    physical - rrr. ctab. abd - soft, nt. no edema. nor agustin.    plan - Pt feeling better. Pt reassured. possibly gerd. will d/c. given return instructions.

## 2024-05-08 NOTE — ED PROVIDER NOTE - PATIENT PORTAL LINK FT
You can access the FollowMyHealth Patient Portal offered by [facility] by registering at the following website: [email or web address]. By joining [facility]’s FollowMyHealth portal, you will also be able to view your health information using other applications (apps) compatible with our system.

## 2024-05-09 PROCEDURE — T1013: CPT

## 2024-05-09 PROCEDURE — 99283 EMERGENCY DEPT VISIT LOW MDM: CPT

## 2024-05-09 RX ORDER — ONDANSETRON 8 MG/1
1 TABLET, FILM COATED ORAL
Qty: 1 | Refills: 0
Start: 2024-05-09 | End: 2024-05-11

## 2024-05-09 NOTE — ED ADULT NURSE NOTE - OBJECTIVE STATEMENT
pt is 29y male presenting to ED for nausea/upset stomach. pt states he had beef soup for dinner that had been refrigerated for a couple days and afterwards began feeling nauseous with generalized abdominal pain. pt denies any allergies, vomiting, diarrhea, sob, headaches. pt in NAD, a&ox3, resting comfortably in stretcher with family at bedside.

## 2024-05-09 NOTE — ED ADULT NURSE NOTE - NSFALLUNIVINTERV_ED_ALL_ED
Bed/Stretcher in lowest position, wheels locked, appropriate side rails in place/Call bell, personal items and telephone in reach/Instruct patient to call for assistance before getting out of bed/chair/stretcher/Non-slip footwear applied when patient is off stretcher/[place] to call system/Physically safe environment - no spills, clutter or unnecessary equipment/Purposeful proactive rounding/Room/bathroom lighting operational, light cord in reach

## 2024-06-21 ENCOUNTER — EMERGENCY (EMERGENCY)
Facility: HOSPITAL | Age: 29
LOS: 1 days | Discharge: HOME | End: 2024-06-21
Attending: EMERGENCY MEDICINE
Payer: MEDICAID

## 2024-06-21 VITALS
HEART RATE: 86 BPM | SYSTOLIC BLOOD PRESSURE: 121 MMHG | OXYGEN SATURATION: 99 % | DIASTOLIC BLOOD PRESSURE: 81 MMHG | RESPIRATION RATE: 18 BRPM | TEMPERATURE: 98 F

## 2024-06-21 VITALS
HEART RATE: 101 BPM | WEIGHT: 263.23 LBS | TEMPERATURE: 98 F | SYSTOLIC BLOOD PRESSURE: 157 MMHG | DIASTOLIC BLOOD PRESSURE: 82 MMHG | HEIGHT: 69 IN | RESPIRATION RATE: 24 BRPM | OXYGEN SATURATION: 97 %

## 2024-06-21 LAB
ALBUMIN SERPL ELPH-MCNC: 4.4 G/DL — SIGNIFICANT CHANGE UP (ref 3.3–5.2)
ALP SERPL-CCNC: 124 U/L — HIGH (ref 40–120)
ALT FLD-CCNC: 39 U/L — SIGNIFICANT CHANGE UP
ANION GAP SERPL CALC-SCNC: 11 MMOL/L — SIGNIFICANT CHANGE UP (ref 5–17)
APPEARANCE UR: CLEAR — SIGNIFICANT CHANGE UP
AST SERPL-CCNC: 25 U/L — SIGNIFICANT CHANGE UP
BACTERIA # UR AUTO: NEGATIVE /HPF — SIGNIFICANT CHANGE UP
BASOPHILS # BLD AUTO: 0.05 K/UL — SIGNIFICANT CHANGE UP (ref 0–0.2)
BASOPHILS NFR BLD AUTO: 0.4 % — SIGNIFICANT CHANGE UP (ref 0–2)
BILIRUB SERPL-MCNC: 0.3 MG/DL — LOW (ref 0.4–2)
BILIRUB UR-MCNC: NEGATIVE — SIGNIFICANT CHANGE UP
BUN SERPL-MCNC: 16.4 MG/DL — SIGNIFICANT CHANGE UP (ref 8–20)
CALCIUM SERPL-MCNC: 9 MG/DL — SIGNIFICANT CHANGE UP (ref 8.4–10.5)
CHLORIDE SERPL-SCNC: 102 MMOL/L — SIGNIFICANT CHANGE UP (ref 96–108)
CK SERPL-CCNC: 150 U/L — SIGNIFICANT CHANGE UP (ref 30–200)
CO2 SERPL-SCNC: 23 MMOL/L — SIGNIFICANT CHANGE UP (ref 22–29)
COLOR SPEC: YELLOW — SIGNIFICANT CHANGE UP
CREAT SERPL-MCNC: 1.13 MG/DL — SIGNIFICANT CHANGE UP (ref 0.5–1.3)
DIFF PNL FLD: ABNORMAL
EGFR: 90 ML/MIN/1.73M2 — SIGNIFICANT CHANGE UP
EOSINOPHIL # BLD AUTO: 0.02 K/UL — SIGNIFICANT CHANGE UP (ref 0–0.5)
EOSINOPHIL NFR BLD AUTO: 0.2 % — SIGNIFICANT CHANGE UP (ref 0–6)
GLUCOSE SERPL-MCNC: 145 MG/DL — HIGH (ref 70–99)
GLUCOSE UR QL: NEGATIVE MG/DL — SIGNIFICANT CHANGE UP
HCT VFR BLD CALC: 46 % — SIGNIFICANT CHANGE UP (ref 39–50)
HGB BLD-MCNC: 15.8 G/DL — SIGNIFICANT CHANGE UP (ref 13–17)
IMM GRANULOCYTES NFR BLD AUTO: 0.5 % — SIGNIFICANT CHANGE UP (ref 0–0.9)
KETONES UR-MCNC: NEGATIVE MG/DL — SIGNIFICANT CHANGE UP
LEUKOCYTE ESTERASE UR-ACNC: NEGATIVE — SIGNIFICANT CHANGE UP
LIDOCAIN IGE QN: 44 U/L — SIGNIFICANT CHANGE UP (ref 22–51)
LYMPHOCYTES # BLD AUTO: 1.3 K/UL — SIGNIFICANT CHANGE UP (ref 1–3.3)
LYMPHOCYTES # BLD AUTO: 10.7 % — LOW (ref 13–44)
MCHC RBC-ENTMCNC: 29.9 PG — SIGNIFICANT CHANGE UP (ref 27–34)
MCHC RBC-ENTMCNC: 34.3 GM/DL — SIGNIFICANT CHANGE UP (ref 32–36)
MCV RBC AUTO: 87 FL — SIGNIFICANT CHANGE UP (ref 80–100)
MONOCYTES # BLD AUTO: 0.9 K/UL — SIGNIFICANT CHANGE UP (ref 0–0.9)
MONOCYTES NFR BLD AUTO: 7.4 % — SIGNIFICANT CHANGE UP (ref 2–14)
NEUTROPHILS # BLD AUTO: 9.8 K/UL — HIGH (ref 1.8–7.4)
NEUTROPHILS NFR BLD AUTO: 80.8 % — HIGH (ref 43–77)
NITRITE UR-MCNC: NEGATIVE — SIGNIFICANT CHANGE UP
PH UR: 6 — SIGNIFICANT CHANGE UP (ref 5–8)
PLATELET # BLD AUTO: 303 K/UL — SIGNIFICANT CHANGE UP (ref 150–400)
POTASSIUM SERPL-MCNC: 4.1 MMOL/L — SIGNIFICANT CHANGE UP (ref 3.5–5.3)
POTASSIUM SERPL-SCNC: 4.1 MMOL/L — SIGNIFICANT CHANGE UP (ref 3.5–5.3)
PROT SERPL-MCNC: 7.2 G/DL — SIGNIFICANT CHANGE UP (ref 6.6–8.7)
PROT UR-MCNC: NEGATIVE MG/DL — SIGNIFICANT CHANGE UP
RBC # BLD: 5.29 M/UL — SIGNIFICANT CHANGE UP (ref 4.2–5.8)
RBC # FLD: 12.4 % — SIGNIFICANT CHANGE UP (ref 10.3–14.5)
RBC CASTS # UR COMP ASSIST: 2 /HPF — SIGNIFICANT CHANGE UP (ref 0–4)
SODIUM SERPL-SCNC: 136 MMOL/L — SIGNIFICANT CHANGE UP (ref 135–145)
SP GR SPEC: 1.01 — SIGNIFICANT CHANGE UP (ref 1–1.03)
SQUAMOUS # UR AUTO: 1 /HPF — SIGNIFICANT CHANGE UP (ref 0–5)
UROBILINOGEN FLD QL: 0.2 MG/DL — SIGNIFICANT CHANGE UP (ref 0.2–1)
WBC # BLD: 12.13 K/UL — HIGH (ref 3.8–10.5)
WBC # FLD AUTO: 12.13 K/UL — HIGH (ref 3.8–10.5)
WBC UR QL: 0 /HPF — SIGNIFICANT CHANGE UP (ref 0–5)

## 2024-06-21 PROCEDURE — 80053 COMPREHEN METABOLIC PANEL: CPT

## 2024-06-21 PROCEDURE — 85025 COMPLETE CBC W/AUTO DIFF WBC: CPT

## 2024-06-21 PROCEDURE — T1013: CPT

## 2024-06-21 PROCEDURE — 74176 CT ABD & PELVIS W/O CONTRAST: CPT | Mod: MC

## 2024-06-21 PROCEDURE — 74176 CT ABD & PELVIS W/O CONTRAST: CPT | Mod: 26,MC

## 2024-06-21 PROCEDURE — 96375 TX/PRO/DX INJ NEW DRUG ADDON: CPT

## 2024-06-21 PROCEDURE — 99285 EMERGENCY DEPT VISIT HI MDM: CPT

## 2024-06-21 PROCEDURE — 82550 ASSAY OF CK (CPK): CPT

## 2024-06-21 PROCEDURE — 83690 ASSAY OF LIPASE: CPT

## 2024-06-21 PROCEDURE — 36415 COLL VENOUS BLD VENIPUNCTURE: CPT

## 2024-06-21 PROCEDURE — 99284 EMERGENCY DEPT VISIT MOD MDM: CPT | Mod: 25

## 2024-06-21 PROCEDURE — 81001 URINALYSIS AUTO W/SCOPE: CPT

## 2024-06-21 PROCEDURE — 96374 THER/PROPH/DIAG INJ IV PUSH: CPT

## 2024-06-21 PROCEDURE — 87086 URINE CULTURE/COLONY COUNT: CPT

## 2024-06-21 RX ORDER — FAMOTIDINE 10 MG/ML
20 INJECTION INTRAVENOUS ONCE
Refills: 0 | Status: COMPLETED | OUTPATIENT
Start: 2024-06-21 | End: 2024-06-21

## 2024-06-21 RX ORDER — KETOROLAC TROMETHAMINE 30 MG/ML
15 SYRINGE (ML) INJECTION ONCE
Refills: 0 | Status: DISCONTINUED | OUTPATIENT
Start: 2024-06-21 | End: 2024-06-21

## 2024-06-21 RX ORDER — ONDANSETRON 8 MG/1
1 TABLET, FILM COATED ORAL
Qty: 1 | Refills: 0
Start: 2024-06-21 | End: 2024-06-25

## 2024-06-21 RX ORDER — TAMSULOSIN HYDROCHLORIDE 0.4 MG/1
1 CAPSULE ORAL
Qty: 7 | Refills: 0
Start: 2024-06-21 | End: 2024-06-27

## 2024-06-21 RX ORDER — CEPHALEXIN 500 MG
1 CAPSULE ORAL
Qty: 28 | Refills: 0
Start: 2024-06-21 | End: 2024-06-27

## 2024-06-21 RX ORDER — TAMSULOSIN HYDROCHLORIDE 0.4 MG/1
0.4 CAPSULE ORAL ONCE
Refills: 0 | Status: COMPLETED | OUTPATIENT
Start: 2024-06-21 | End: 2024-06-21

## 2024-06-21 RX ORDER — ONDANSETRON 8 MG/1
4 TABLET, FILM COATED ORAL ONCE
Refills: 0 | Status: COMPLETED | OUTPATIENT
Start: 2024-06-21 | End: 2024-06-21

## 2024-06-21 RX ORDER — OXYCODONE AND ACETAMINOPHEN 5; 325 MG/1; MG/1
1 TABLET ORAL
Qty: 9 | Refills: 0
Start: 2024-06-21 | End: 2024-06-23

## 2024-06-21 RX ORDER — MORPHINE SULFATE 50 MG/1
4 CAPSULE, EXTENDED RELEASE ORAL ONCE
Refills: 0 | Status: DISCONTINUED | OUTPATIENT
Start: 2024-06-21 | End: 2024-06-21

## 2024-06-21 RX ORDER — SODIUM CHLORIDE 9 MG/ML
1000 INJECTION INTRAMUSCULAR; INTRAVENOUS; SUBCUTANEOUS ONCE
Refills: 0 | Status: COMPLETED | OUTPATIENT
Start: 2024-06-21 | End: 2024-06-21

## 2024-06-21 RX ORDER — IBUPROFEN 200 MG
1 TABLET ORAL
Qty: 20 | Refills: 0
Start: 2024-06-21 | End: 2024-06-25

## 2024-06-21 RX ADMIN — ONDANSETRON 4 MILLIGRAM(S): 8 TABLET, FILM COATED ORAL at 20:51

## 2024-06-21 RX ADMIN — FAMOTIDINE 20 MILLIGRAM(S): 10 INJECTION INTRAVENOUS at 20:51

## 2024-06-21 RX ADMIN — TAMSULOSIN HYDROCHLORIDE 0.4 MILLIGRAM(S): 0.4 CAPSULE ORAL at 23:38

## 2024-06-21 RX ADMIN — MORPHINE SULFATE 4 MILLIGRAM(S): 50 CAPSULE, EXTENDED RELEASE ORAL at 20:51

## 2024-06-21 RX ADMIN — SODIUM CHLORIDE 1000 MILLILITER(S): 9 INJECTION INTRAMUSCULAR; INTRAVENOUS; SUBCUTANEOUS at 20:51

## 2024-06-21 RX ADMIN — Medication 15 MILLIGRAM(S): at 23:38

## 2024-06-21 NOTE — ED ADULT NURSE REASSESSMENT NOTE - NS ED NURSE REASSESS COMMENT FT1
Received patient from KENNETH Paul at 23:20, charting as noted, patient is awake, calm, rr even and unlabored, denies sob, denies chest pain, tolerated po med well, iv patent, stretcher locked in lowest position.

## 2024-06-21 NOTE — ED ADULT NURSE NOTE - NSFALLUNIVINTERV_ED_ALL_ED
Bed/Stretcher in lowest position, wheels locked, appropriate side rails in place/Call bell, personal items and telephone in reach/Instruct patient to call for assistance before getting out of bed/chair/stretcher/Non-slip footwear applied when patient is off stretcher/[place] to call system/Physically safe environment - no spills, clutter or unnecessary equipment/Purposeful proactive rounding/Room/bathroom lighting operational, light cord in reach

## 2024-06-21 NOTE — ED PROVIDER NOTE - CARE PROVIDER_API CALL
Yecenia Hayes  Urology  [address], [address]  [place] [address]  Phone: [phone]  Fax: [phone]  Follow Up Time:

## 2024-06-21 NOTE — ED PROVIDER NOTE - PHYSICAL EXAMINATION
General:     NAD  Head:     NC/AT, EOMI, oral mucosa moist  Neck:     trachea midline  Lungs:     CTA b/l, no w/r/r  CVS:     S1S2, RRR, no m/g/r  Abd:     +BS, s/nd, no organomegaly. TTP @ [facility]. +left CVAT

## 2024-06-21 NOTE — ED ADULT TRIAGE NOTE - CHIEF COMPLAINT QUOTE
left side abdominal pain nausea and dry heaves. no known sick contacts
shortness of breath pt recently had Pulmonary embolisms. patient currently having increased work of breathing. placed on 2lnc

## 2024-06-21 NOTE — ED PROVIDER NOTE - PROGRESS NOTE DETAILS
PT evaluated by intake physician. HPI/PE/ROS as noted above. Will follow up plan per intake physician Pt reassessed, pt feeling better at this time, vss, pt able to walk, talk and vocalized plan of action. Discussed in depth and explained to pt in depth the next steps that need to be taking including proper follow up with PCP or specialists. All incidental findings were discussed with pt as well. Pt verbalized their concerns and all questions were answered. Pt understands dispo and wants discharge. Given good instructions when to return to ED, strict return precautions and importance of f/u.

## 2024-06-21 NOTE — ED PROVIDER NOTE - PATIENT PORTAL LINK FT
You can access the FollowMyHealth Patient Portal offered by [facility] by registering at the following website: [email or web address]. By joining [facility]’s FollowMyHealth portal, you will also be able to view your health information using other applications (apps) compatible with our system.

## 2024-06-21 NOTE — ED PROVIDER NOTE - CLINICAL SUMMARY MEDICAL DECISION MAKING FREE TEXT BOX
(DAISHA Mckinney MD) Initial Assessment: 29-year-old male; with no significant PMH; now presenting with abdominal pain–left flank rating to left lower quadrant and left testicle, associated with nausea and vomiting. labs, ivf, zofran, ct, re-eval      ACP to complete summary of medical encounter below.  Summary of Clinical Encounter: (DAISHA Mckinney MD) Initial Assessment: 29-year-old male; with no significant PMH; now presenting with abdominal pain–left flank rating to left lower quadrant and left testicle, associated with nausea and vomiting. labs, ivf, zofran, ct, re-eval      ACP to complete summary of medical encounter below.  Summary of Clinical Encounter:  Leukocytosis with left shift. no electrolyte abnl. BUN/Cr wnl. CT showed Mild left hydronephrosis secondary to a 2 mm proximal left ureteral stone. (DAISHA Mckinney MD) Initial Assessment: 29-year-old male; with no significant PMH; now presenting with abdominal pain–left flank rating to left lower quadrant and left testicle, associated with nausea and vomiting. labs, ivf, zofran, ct, re-eval      ACP to complete summary of medical encounter below.  Summary of Clinical Encounter:  Leukocytosis with left shift. no electrolyte abnl. BUN/Cr wnl. CT showed Mild left hydronephrosis secondary to a 2 mm proximal left ureteral stone with Minimal proximal periureteral and perinephric stranding. will cover with abx. pain well controlled. tolerating po well. abd exam benign. urology f/u given. strict return precautions explained. vss. afebrile.

## 2024-06-21 NOTE — ED PROVIDER NOTE - OBJECTIVE STATEMENT
29-year-old male; with no significant PMH; now presenting with abdominal pain–left flank rating to left lower quadrant and left testicle, associated with nausea and vomiting.  Patient reports he was working out in the sun over the past 8 hours.  States he did stop for breaks and was drinking water.  Reports towards the end of his work he began to have the of left flank pain rating to left lower quadrant.  Reports vomiting x 5-6 episodes–NBNB.  Denies diarrhea.  Denies dysuria, frequency, urgency, hematuria.

## 2024-06-21 NOTE — ED PROVIDER NOTE - NSFOLLOWUPINSTRUCTIONS_ED_ALL_ED_FT
- Please follow-up with your primary care doctor in the next 1-2 days.  Please call tomorrow for an appointment.  If you cannot follow-up with your primary care doctor please return to the ED for any urgent issues.  - You were given a copy of the tests performed today.  Please bring the results with you and review them with your primary care doctor.  - If you have any worsening of symptoms or any other concerns please return to the ED immediately.  - Please continue taking your home medications as directed.   - Follow up with the urologist within 3-5 days.     Kidney Stones    Kidney stones (urolithiasis) are crystal deposits that form inside your kidneys. Pain is caused by the stone moving through the urinary tract, causing spasms of the ureter. Drink enough water and fluids to keep your urine clear or pale yellow. This will help you to pass the stone or stone fragments. If provided a strainer, strain all urine and keep all particulate matter and stones for a follow up appointment with a urologist.    SEEK IMMEDIATE MEDICAL CARE IF YOU HAVE ANY OF THE FOLLOWING SYMPTOMS: pain not controlled with medication, fever/chills, worsening vomiting, inability to urinate, or dizziness/lightheadedness.      - Poppy un seguimiento con werner médico de atención primaria en los próximos 1 o 2 días.  Por favor llame mañana para james jack.  Si no puede realizar un seguimiento con werner médico de atención primaria, regrese al servicio de urgencias si tiene algún problema urgente.  - Le entregaron james copia de las pruebas realizadas hoy.  Traiga los resultados con usted y revíselos con werner médico de atención primaria.  - Si j carlos síntomas empeoran o tiene alguna otra inquietud, regrese al servicio de urgencias de inmediato.  - Continúe tomando j carlos medicamentos caseros según las indicaciones.   - Seguimiento con el urólogo dentro de 3-5 días.     Cálculos renales    Los cálculos renales (urolitiasis) son depósitos de molina que se meredith dentro de los riñones. El dolor es causado por el movimiento del cálculo a través del tracto urinario, provocando espasmos en el uréter. Kimber suficiente agua y líquidos para mantener la orina raheem o de color amarillo pálido. Remer le ayudará a pasar la preet o los fragmentos de preet. Si se le proporciona un colador, cuele toda la orina y guarde todas las partículas y cálculos para james jack de seguimiento con un urólogo.    BUSQUE ATENCIÓN MÉDICA INMEDIATA SI TIENE ALGUNO DE LOS SIGUIENTES SÍNTOMAS: dolor que no se controla con medicamentos, fiebre/escalofríos, empeoramiento de los vómitos, incapacidad para orinar o mareos/aturdimiento.

## 2024-06-21 NOTE — ED ADULT NURSE NOTE - OBJECTIVE STATEMENT
Pt A&Ox4. Came in w/ c/o left sided abdominal pain radiating to left flank since 4pm today with accompanied nausea and dry heaving. Pt denies urinary symptoms, fevers, chills, headache, chest pain, SOB. VS stable, RR even and unlabored, safety maintained.

## 2024-06-23 LAB
CULTURE RESULTS: SIGNIFICANT CHANGE UP
SPECIMEN SOURCE: SIGNIFICANT CHANGE UP

## 2024-09-04 NOTE — ED PROVIDER NOTE - CLINICAL SUMMARY MEDICAL DECISION MAKING FREE TEXT BOX
Chronic illness 29 yo male no PMHx presents to ED c/o nausea and generalized weakness after eating steak and shrimp at 8:30am, now only c/o upset stomach. No vomiting, abdomen benign. Received Maalox and Pepcid with improvement. Medically stable for discharge.